# Patient Record
Sex: FEMALE | Race: ASIAN | NOT HISPANIC OR LATINO | URBAN - METROPOLITAN AREA
[De-identification: names, ages, dates, MRNs, and addresses within clinical notes are randomized per-mention and may not be internally consistent; named-entity substitution may affect disease eponyms.]

---

## 2018-06-14 ENCOUNTER — PREPPED CHART (OUTPATIENT)
Dept: URBAN - METROPOLITAN AREA CLINIC 101 | Facility: CLINIC | Age: 78
End: 2018-06-14

## 2023-03-29 ASSESSMENT — TONOMETRY
OD_IOP_MMHG: 18
OS_IOP_MMHG: 19

## 2023-03-29 ASSESSMENT — VISUAL ACUITY
OD_SC: 20/100
OS_SC: 20/40-2
OD_PH: 20/50

## 2023-06-27 ENCOUNTER — APPOINTMENT (RX ONLY)
Dept: URBAN - METROPOLITAN AREA CLINIC 38 | Facility: CLINIC | Age: 83
Setting detail: DERMATOLOGY
End: 2023-06-27

## 2023-06-27 DIAGNOSIS — I87.2 VENOUS INSUFFICIENCY (CHRONIC) (PERIPHERAL): ICD-10-CM | Status: INADEQUATELY CONTROLLED

## 2023-06-27 PROCEDURE — 99204 OFFICE O/P NEW MOD 45 MIN: CPT

## 2023-06-27 PROCEDURE — ? TREATMENT REGIMEN

## 2023-06-27 PROCEDURE — ? PRESCRIPTION

## 2023-06-27 PROCEDURE — ? COUNSELING

## 2023-06-27 RX ORDER — DESOXIMETASONE 2.5 MG/G
OINTMENT TOPICAL
Qty: 60 | Refills: 2 | Status: ERX | COMMUNITY
Start: 2023-06-27

## 2023-06-27 RX ADMIN — DESOXIMETASONE: 2.5 OINTMENT TOPICAL at 00:00

## 2023-06-27 ASSESSMENT — LOCATION DETAILED DESCRIPTION DERM
LOCATION DETAILED: RIGHT DISTAL PRETIBIAL REGION
LOCATION DETAILED: LEFT DISTAL PRETIBIAL REGION

## 2023-06-27 ASSESSMENT — LOCATION ZONE DERM: LOCATION ZONE: LEG

## 2023-06-27 ASSESSMENT — LOCATION SIMPLE DESCRIPTION DERM
LOCATION SIMPLE: RIGHT PRETIBIAL REGION
LOCATION SIMPLE: LEFT PRETIBIAL REGION

## 2023-06-27 NOTE — HPI: RASH
What Type Of Note Output Would You Prefer (Optional)?: Standard Output
Is The Patient Presenting As Previously Scheduled?: Yes
How Severe Is Your Rash?: moderate
Is This A New Presentation, Or A Follow-Up?: Rash
Additional History: Used betamethasone dipropionate and mupirocin in the past. Not currently using as they’re out of medication

## 2023-06-28 RX ORDER — DESOXIMETASONE 2.5 MG/G
OINTMENT TOPICAL
Qty: 60 | Refills: 2 | Status: ERX

## 2023-11-16 ENCOUNTER — NEW PATIENT (OUTPATIENT)
Dept: URBAN - METROPOLITAN AREA CLINIC 101 | Facility: CLINIC | Age: 83
End: 2023-11-16

## 2023-11-16 DIAGNOSIS — H35.3221: ICD-10-CM

## 2023-11-16 DIAGNOSIS — H26.493: ICD-10-CM

## 2023-11-16 DIAGNOSIS — H35.3112: ICD-10-CM

## 2023-11-16 DIAGNOSIS — H35.033: ICD-10-CM

## 2023-11-16 DIAGNOSIS — H43.811: ICD-10-CM

## 2023-11-16 DIAGNOSIS — H35.722: ICD-10-CM

## 2023-11-16 PROCEDURE — 67028 INJECTION EYE DRUG: CPT

## 2023-11-16 PROCEDURE — 92134 CPTRZ OPH DX IMG PST SGM RTA: CPT

## 2023-11-16 PROCEDURE — 92202 OPSCPY EXTND ON/MAC DRAW: CPT | Mod: 59

## 2023-11-16 PROCEDURE — PFS EYLEA PFS: Mod: JZ

## 2023-11-16 PROCEDURE — 99204 OFFICE O/P NEW MOD 45 MIN: CPT | Mod: 25

## 2023-11-16 ASSESSMENT — VISUAL ACUITY
OS_SC: 20/100+1
OD_SC: 20/30-2
OS_PH: 20/80-1

## 2023-11-16 ASSESSMENT — TONOMETRY
OD_IOP_MMHG: 14
OS_IOP_MMHG: 12

## 2023-12-14 ENCOUNTER — FOLLOW UP (OUTPATIENT)
Dept: URBAN - METROPOLITAN AREA CLINIC 101 | Facility: CLINIC | Age: 83
End: 2023-12-14

## 2023-12-14 DIAGNOSIS — H43.811: ICD-10-CM

## 2023-12-14 DIAGNOSIS — H26.493: ICD-10-CM

## 2023-12-14 DIAGNOSIS — H35.3112: ICD-10-CM

## 2023-12-14 DIAGNOSIS — H35.3221: ICD-10-CM

## 2023-12-14 DIAGNOSIS — H35.033: ICD-10-CM

## 2023-12-14 PROCEDURE — PFS EYLEA PFS: Mod: JZ

## 2023-12-14 PROCEDURE — 92202 OPSCPY EXTND ON/MAC DRAW: CPT | Mod: 59

## 2023-12-14 PROCEDURE — 92134 CPTRZ OPH DX IMG PST SGM RTA: CPT

## 2023-12-14 PROCEDURE — 67028 INJECTION EYE DRUG: CPT

## 2023-12-14 PROCEDURE — 92014 COMPRE OPH EXAM EST PT 1/>: CPT | Mod: 25

## 2023-12-14 ASSESSMENT — TONOMETRY
OS_IOP_MMHG: 10
OD_IOP_MMHG: 11

## 2023-12-14 ASSESSMENT — VISUAL ACUITY
OD_SC: 20/20-1
OS_SC: 20/60-1
OS_PH: 20/40

## 2024-01-18 ENCOUNTER — FOLLOW UP (OUTPATIENT)
Dept: URBAN - METROPOLITAN AREA CLINIC 101 | Facility: CLINIC | Age: 84
End: 2024-01-18

## 2024-01-18 DIAGNOSIS — H35.3112: ICD-10-CM

## 2024-01-18 DIAGNOSIS — H35.3221: ICD-10-CM

## 2024-01-18 DIAGNOSIS — H26.493: ICD-10-CM

## 2024-01-18 DIAGNOSIS — H43.813: ICD-10-CM

## 2024-01-18 DIAGNOSIS — H35.033: ICD-10-CM

## 2024-01-18 PROCEDURE — HD EYLEA HD 8MG: Mod: JZ

## 2024-01-18 PROCEDURE — 92014 COMPRE OPH EXAM EST PT 1/>: CPT | Mod: 25

## 2024-01-18 PROCEDURE — 92202 OPSCPY EXTND ON/MAC DRAW: CPT | Mod: 59

## 2024-01-18 PROCEDURE — 67028 INJECTION EYE DRUG: CPT

## 2024-01-18 PROCEDURE — 92134 CPTRZ OPH DX IMG PST SGM RTA: CPT

## 2024-01-18 ASSESSMENT — TONOMETRY
OD_IOP_MMHG: 10
OS_IOP_MMHG: 11

## 2024-01-18 ASSESSMENT — VISUAL ACUITY
OS_PH: 20/40-2
OS_SC: 20/50-1
OD_SC: 20/25

## 2024-02-22 ENCOUNTER — FOLLOW UP (OUTPATIENT)
Dept: URBAN - METROPOLITAN AREA CLINIC 101 | Facility: CLINIC | Age: 84
End: 2024-02-22

## 2024-02-22 DIAGNOSIS — H43.813: ICD-10-CM

## 2024-02-22 DIAGNOSIS — H35.033: ICD-10-CM

## 2024-02-22 DIAGNOSIS — H35.3221: ICD-10-CM

## 2024-02-22 DIAGNOSIS — H35.3112: ICD-10-CM

## 2024-02-22 PROCEDURE — 92014 COMPRE OPH EXAM EST PT 1/>: CPT | Mod: 25

## 2024-02-22 PROCEDURE — HD EYLEA HD 8MG: Mod: JZ

## 2024-02-22 PROCEDURE — 92134 CPTRZ OPH DX IMG PST SGM RTA: CPT

## 2024-02-22 PROCEDURE — 92202 OPSCPY EXTND ON/MAC DRAW: CPT | Mod: 59

## 2024-02-22 PROCEDURE — 67028 INJECTION EYE DRUG: CPT

## 2024-02-22 ASSESSMENT — TONOMETRY
OS_IOP_MMHG: 12
OD_IOP_MMHG: 12

## 2024-02-22 ASSESSMENT — VISUAL ACUITY
OS_SC: 20/50
OD_SC: 20/25

## 2024-03-20 ENCOUNTER — FOLLOW UP (OUTPATIENT)
Dept: URBAN - METROPOLITAN AREA CLINIC 101 | Facility: CLINIC | Age: 84
End: 2024-03-20

## 2024-03-20 DIAGNOSIS — H35.033: ICD-10-CM

## 2024-03-20 DIAGNOSIS — H43.813: ICD-10-CM

## 2024-03-20 DIAGNOSIS — H35.3112: ICD-10-CM

## 2024-03-20 DIAGNOSIS — H35.3221: ICD-10-CM

## 2024-03-20 PROCEDURE — 92014 COMPRE OPH EXAM EST PT 1/>: CPT

## 2024-03-20 PROCEDURE — 92134 CPTRZ OPH DX IMG PST SGM RTA: CPT

## 2024-03-20 PROCEDURE — 92202 OPSCPY EXTND ON/MAC DRAW: CPT

## 2024-03-20 ASSESSMENT — VISUAL ACUITY
OS_SC: 20/50-1
OD_SC: 20/20-2

## 2024-03-20 ASSESSMENT — TONOMETRY
OS_IOP_MMHG: 12
OD_IOP_MMHG: 12

## 2024-03-21 ENCOUNTER — INJECTION ONLY (OUTPATIENT)
Dept: URBAN - METROPOLITAN AREA CLINIC 101 | Facility: CLINIC | Age: 84
End: 2024-03-21

## 2024-03-21 DIAGNOSIS — H35.3221: ICD-10-CM

## 2024-03-21 PROCEDURE — 67028 INJECTION EYE DRUG: CPT

## 2024-03-21 PROCEDURE — HD EYLEA HD 8MG: Mod: JZ

## 2024-05-22 ENCOUNTER — FOLLOW UP (OUTPATIENT)
Dept: URBAN - METROPOLITAN AREA CLINIC 101 | Facility: CLINIC | Age: 84
End: 2024-05-22

## 2024-05-22 DIAGNOSIS — H35.3112: ICD-10-CM

## 2024-05-22 DIAGNOSIS — H43.813: ICD-10-CM

## 2024-05-22 DIAGNOSIS — H35.3221: ICD-10-CM

## 2024-05-22 DIAGNOSIS — H35.033: ICD-10-CM

## 2024-05-22 PROCEDURE — 92134 CPTRZ OPH DX IMG PST SGM RTA: CPT

## 2024-05-22 PROCEDURE — 67028 INJECTION EYE DRUG: CPT

## 2024-05-22 PROCEDURE — 92014 COMPRE OPH EXAM EST PT 1/>: CPT | Mod: 25

## 2024-05-22 PROCEDURE — 92202 OPSCPY EXTND ON/MAC DRAW: CPT | Mod: 59

## 2024-05-22 ASSESSMENT — VISUAL ACUITY
OD_SC: 20/20-1
OS_SC: 20/40

## 2024-05-22 ASSESSMENT — TONOMETRY
OS_IOP_MMHG: 13
OD_IOP_MMHG: 14

## 2024-07-25 ENCOUNTER — FOLLOW UP (OUTPATIENT)
Dept: URBAN - METROPOLITAN AREA CLINIC 101 | Facility: CLINIC | Age: 84
End: 2024-07-25

## 2024-07-25 DIAGNOSIS — H35.033: ICD-10-CM

## 2024-07-25 DIAGNOSIS — H43.813: ICD-10-CM

## 2024-07-25 DIAGNOSIS — H35.3112: ICD-10-CM

## 2024-07-25 DIAGNOSIS — H35.3221: ICD-10-CM

## 2024-07-25 PROCEDURE — 92134 CPTRZ OPH DX IMG PST SGM RTA: CPT

## 2024-07-25 PROCEDURE — 67028 INJECTION EYE DRUG: CPT

## 2024-07-25 PROCEDURE — 92014 COMPRE OPH EXAM EST PT 1/>: CPT | Mod: 25

## 2024-07-25 PROCEDURE — 92202 OPSCPY EXTND ON/MAC DRAW: CPT | Mod: 59

## 2024-07-25 ASSESSMENT — VISUAL ACUITY
OD_SC: 20/25-1
OS_SC: 20/50

## 2024-07-25 ASSESSMENT — TONOMETRY
OD_IOP_MMHG: 11
OS_IOP_MMHG: 11

## 2024-09-25 ENCOUNTER — FOLLOW UP (OUTPATIENT)
Dept: URBAN - METROPOLITAN AREA CLINIC 101 | Facility: CLINIC | Age: 84
End: 2024-09-25

## 2024-09-25 DIAGNOSIS — H43.813: ICD-10-CM

## 2024-09-25 DIAGNOSIS — H35.3112: ICD-10-CM

## 2024-09-25 DIAGNOSIS — H35.3221: ICD-10-CM

## 2024-09-25 DIAGNOSIS — H35.033: ICD-10-CM

## 2024-09-25 PROCEDURE — 92202 OPSCPY EXTND ON/MAC DRAW: CPT | Mod: 59

## 2024-09-25 PROCEDURE — 92134 CPTRZ OPH DX IMG PST SGM RTA: CPT

## 2024-09-25 PROCEDURE — 92014 COMPRE OPH EXAM EST PT 1/>: CPT | Mod: 25

## 2024-09-25 PROCEDURE — 67028 INJECTION EYE DRUG: CPT

## 2024-09-25 ASSESSMENT — VISUAL ACUITY
OD_SC: 20/30
OS_SC: 20/60

## 2024-09-25 ASSESSMENT — TONOMETRY
OD_IOP_MMHG: 10
OS_IOP_MMHG: 11

## 2024-12-04 ENCOUNTER — FOLLOW UP (OUTPATIENT)
Dept: URBAN - METROPOLITAN AREA CLINIC 101 | Facility: CLINIC | Age: 84
End: 2024-12-04

## 2024-12-04 DIAGNOSIS — H35.3112: ICD-10-CM

## 2024-12-04 DIAGNOSIS — H35.3221: ICD-10-CM

## 2024-12-04 DIAGNOSIS — H43.813: ICD-10-CM

## 2024-12-04 DIAGNOSIS — H35.033: ICD-10-CM

## 2024-12-04 PROCEDURE — 92202 OPSCPY EXTND ON/MAC DRAW: CPT | Mod: 59

## 2024-12-04 PROCEDURE — 92014 COMPRE OPH EXAM EST PT 1/>: CPT | Mod: 25

## 2024-12-04 PROCEDURE — 92134 CPTRZ OPH DX IMG PST SGM RTA: CPT

## 2024-12-04 PROCEDURE — 67028 INJECTION EYE DRUG: CPT

## 2024-12-04 ASSESSMENT — TONOMETRY
OD_IOP_MMHG: 12
OS_IOP_MMHG: 12

## 2024-12-04 ASSESSMENT — VISUAL ACUITY
OS_SC: 20/60+2
OD_SC: 20/30

## 2025-02-20 ENCOUNTER — FOLLOW UP (OUTPATIENT)
Dept: URBAN - METROPOLITAN AREA CLINIC 101 | Facility: CLINIC | Age: 85
End: 2025-02-20

## 2025-02-20 DIAGNOSIS — H35.3221: ICD-10-CM

## 2025-02-20 DIAGNOSIS — H43.813: ICD-10-CM

## 2025-02-20 DIAGNOSIS — H35.3112: ICD-10-CM

## 2025-02-20 DIAGNOSIS — H35.033: ICD-10-CM

## 2025-02-20 PROCEDURE — 92014 COMPRE OPH EXAM EST PT 1/>: CPT | Mod: 25

## 2025-02-20 PROCEDURE — 67028 INJECTION EYE DRUG: CPT

## 2025-02-20 PROCEDURE — 92134 CPTRZ OPH DX IMG PST SGM RTA: CPT

## 2025-02-20 PROCEDURE — 92202 OPSCPY EXTND ON/MAC DRAW: CPT | Mod: 59

## 2025-02-20 ASSESSMENT — VISUAL ACUITY
OS_SC: 20/50
OD_SC: 20/25

## 2025-02-20 ASSESSMENT — TONOMETRY
OS_IOP_MMHG: 10
OD_IOP_MMHG: 10

## 2025-04-24 ENCOUNTER — FOLLOW UP (OUTPATIENT)
Dept: URBAN - METROPOLITAN AREA CLINIC 101 | Facility: CLINIC | Age: 85
End: 2025-04-24

## 2025-04-24 DIAGNOSIS — H35.033: ICD-10-CM

## 2025-04-24 DIAGNOSIS — H35.3112: ICD-10-CM

## 2025-04-24 DIAGNOSIS — H35.3221: ICD-10-CM

## 2025-04-24 DIAGNOSIS — H43.813: ICD-10-CM

## 2025-04-24 PROCEDURE — 67028 INJECTION EYE DRUG: CPT

## 2025-04-24 PROCEDURE — 92134 CPTRZ OPH DX IMG PST SGM RTA: CPT

## 2025-04-24 PROCEDURE — 92014 COMPRE OPH EXAM EST PT 1/>: CPT | Mod: 25

## 2025-04-24 PROCEDURE — 92201 OPSCPY EXTND RTA DRAW UNI/BI: CPT | Mod: 59

## 2025-04-24 ASSESSMENT — TONOMETRY
OS_IOP_MMHG: 9
OD_IOP_MMHG: 9

## 2025-04-24 ASSESSMENT — VISUAL ACUITY
OD_SC: 20/25
OS_SC: 20/50

## 2025-07-02 ENCOUNTER — FOLLOW UP (OUTPATIENT)
Dept: URBAN - METROPOLITAN AREA CLINIC 101 | Facility: CLINIC | Age: 85
End: 2025-07-02

## 2025-07-02 DIAGNOSIS — H35.033: ICD-10-CM

## 2025-07-02 DIAGNOSIS — H35.3221: ICD-10-CM

## 2025-07-02 DIAGNOSIS — H35.3112: ICD-10-CM

## 2025-07-02 DIAGNOSIS — H43.813: ICD-10-CM

## 2025-07-02 PROCEDURE — 67028 INJECTION EYE DRUG: CPT

## 2025-07-02 PROCEDURE — 92014 COMPRE OPH EXAM EST PT 1/>: CPT | Mod: 25

## 2025-07-02 PROCEDURE — 92202 OPSCPY EXTND ON/MAC DRAW: CPT | Mod: 59

## 2025-07-02 PROCEDURE — 92134 CPTRZ OPH DX IMG PST SGM RTA: CPT

## 2025-07-02 ASSESSMENT — TONOMETRY
OD_IOP_MMHG: 16
OS_IOP_MMHG: 16

## 2025-07-02 ASSESSMENT — VISUAL ACUITY
OS_SC: 20/70
OD_SC: 20/30
OS_PH: 20/50